# Patient Record
Sex: FEMALE | Race: WHITE
[De-identification: names, ages, dates, MRNs, and addresses within clinical notes are randomized per-mention and may not be internally consistent; named-entity substitution may affect disease eponyms.]

---

## 2018-08-21 ENCOUNTER — HOSPITAL ENCOUNTER (EMERGENCY)
Dept: HOSPITAL 26 - MED | Age: 43
LOS: 1 days | Discharge: HOME | End: 2018-08-22
Payer: SELF-PAY

## 2018-08-21 VITALS — BODY MASS INDEX: 38.14 KG/M2 | WEIGHT: 202 LBS | HEIGHT: 61 IN

## 2018-08-21 VITALS — DIASTOLIC BLOOD PRESSURE: 90 MMHG | SYSTOLIC BLOOD PRESSURE: 152 MMHG

## 2018-08-21 DIAGNOSIS — N83.209: Primary | ICD-10-CM

## 2018-08-21 PROCEDURE — 81002 URINALYSIS NONAUTO W/O SCOPE: CPT

## 2018-08-21 PROCEDURE — 96372 THER/PROPH/DIAG INJ SC/IM: CPT

## 2018-08-21 PROCEDURE — 81025 URINE PREGNANCY TEST: CPT

## 2018-08-21 PROCEDURE — 99284 EMERGENCY DEPT VISIT MOD MDM: CPT

## 2018-08-21 PROCEDURE — 76856 US EXAM PELVIC COMPLETE: CPT

## 2018-08-21 PROCEDURE — 74018 RADEX ABDOMEN 1 VIEW: CPT

## 2018-08-21 NOTE — NUR
PATIENT PRESENTS TO ED WITH C/O LEFT LOWER ABD PAIN, WITH VAG BLEEDING FOR 5 
DAYS, NAUSEA

SKIN IS PINK/WARM/DRY; AAOX4 WITH EVEN AND STEADY GAIT; LUNGS CLEAR BL; HR EVEN 
AND REGULAR; PATIENT STATES PAIN OF 10/10 AT THIS TIME; PATIENT POSITIONED FOR 
COMFORT; HOB ELEVATED; BEDRAILS UP X2; BED DOWN.

## 2018-08-22 VITALS — SYSTOLIC BLOOD PRESSURE: 138 MMHG | DIASTOLIC BLOOD PRESSURE: 89 MMHG

## 2018-08-22 NOTE — NUR
Patient discharged with v/s stable. Written and verbal after care instructions 
given and explained. 

Patient alert, oriented and verbalized understanding of instructions. 
Ambulatory with steady gait. All questions addressed prior to discharge. ID 
band removed. Patient advised to follow up with PMD. Rx of TRAMADOL AND 
LACTULOSE given. Patient educated on indication of medication including 
possible reaction and side effects. Opportunity to ask questions provided and 
answered.

## 2018-09-19 ENCOUNTER — HOSPITAL ENCOUNTER (EMERGENCY)
Dept: HOSPITAL 26 - MED | Age: 43
Discharge: LEFT BEFORE DISCHARGE | End: 2018-09-19
Payer: SELF-PAY

## 2018-09-19 DIAGNOSIS — Z53.21: Primary | ICD-10-CM

## 2018-09-19 NOTE — NUR
PATIENT CALLED TO BE TRIAGE NO RESPONSE

PATIENT LEFT WITHOUT BEING SEEN BY DR. ASHFORD. NO FURTHER CARE PROVIDED FOR 
PATIENT.

## 2024-01-31 ENCOUNTER — HOSPITAL ENCOUNTER (EMERGENCY)
Dept: HOSPITAL 4 - SED | Age: 49
Discharge: HOME | End: 2024-01-31
Payer: MEDICAID

## 2024-01-31 VITALS
HEART RATE: 127 BPM | TEMPERATURE: 97 F | OXYGEN SATURATION: 100 % | RESPIRATION RATE: 17 BRPM | SYSTOLIC BLOOD PRESSURE: 138 MMHG

## 2024-01-31 VITALS — BODY MASS INDEX: 38.64 KG/M2 | WEIGHT: 210 LBS | HEIGHT: 62 IN

## 2024-01-31 DIAGNOSIS — Z20.822: ICD-10-CM

## 2024-01-31 DIAGNOSIS — M79.10: ICD-10-CM

## 2024-01-31 DIAGNOSIS — J20.9: Primary | ICD-10-CM

## 2024-01-31 DIAGNOSIS — Z79.899: ICD-10-CM

## 2024-01-31 DIAGNOSIS — R50.9: ICD-10-CM

## 2024-01-31 DIAGNOSIS — J02.9: ICD-10-CM

## 2024-01-31 LAB — FLUBV AG UPPER RESP QL IA.RAPID: NEGATIVE

## 2024-02-12 ENCOUNTER — HOSPITAL ENCOUNTER (EMERGENCY)
Dept: HOSPITAL 4 - SED | Age: 49
Discharge: LEFT BEFORE DISCHARGE | End: 2024-02-12
Payer: MEDICAID

## 2024-02-12 VITALS
OXYGEN SATURATION: 96 % | SYSTOLIC BLOOD PRESSURE: 150 MMHG | TEMPERATURE: 98.8 F | HEART RATE: 128 BPM | RESPIRATION RATE: 16 BRPM

## 2024-02-12 VITALS — HEIGHT: 62 IN | BODY MASS INDEX: 38.64 KG/M2 | WEIGHT: 210 LBS

## 2024-02-12 DIAGNOSIS — J18.9: Primary | ICD-10-CM

## 2024-02-12 DIAGNOSIS — J02.9: ICD-10-CM

## 2024-02-12 DIAGNOSIS — R09.81: ICD-10-CM

## 2024-02-12 DIAGNOSIS — R05.9: ICD-10-CM

## 2024-02-12 DIAGNOSIS — Z20.822: ICD-10-CM

## 2024-02-12 DIAGNOSIS — Z79.899: ICD-10-CM
